# Patient Record
Sex: FEMALE | Race: WHITE | URBAN - METROPOLITAN AREA
[De-identification: names, ages, dates, MRNs, and addresses within clinical notes are randomized per-mention and may not be internally consistent; named-entity substitution may affect disease eponyms.]

---

## 2019-04-26 ENCOUNTER — TELEPHONE (OUTPATIENT)
Dept: CARE COORDINATION | Facility: CLINIC | Age: 59
End: 2019-04-26

## 2019-04-26 ENCOUNTER — TELEPHONE (OUTPATIENT)
Dept: PSYCHIATRY | Facility: CLINIC | Age: 59
End: 2019-04-26

## 2019-04-26 ENCOUNTER — PATIENT OUTREACH (OUTPATIENT)
Dept: CARE COORDINATION | Facility: CLINIC | Age: 59
End: 2019-04-26

## 2019-04-26 NOTE — TELEPHONE ENCOUNTER
"PSYCHIATRY CLINIC PHONE INTAKE     SERVICES REQUESTED / INTERESTED IN          Psychological Testing (could include ADHD testing)    Presenting Problem and Brief History                              What would you like to be seen for? (brief description):  Patients daughter called the clinic today from [place] stating that her mother [facility], as her PCP is highly recommending it. Daughter stated that she received all of this information , and it is a little difficult to get the correct information from Patient. Daughter stated patient has tried TMS in the past to no positive degree. Patient underwent TMS in the past year this last fall and winter. Daughter stated that her Provider for patient is unknown, but she believes it is in [facility]. This writer saw no past history with [facility] at this current time. Daughter stated that patients OCD is currently heightened \"Bad\". Daughter stated that her depression, and anxiety is not doing well to any degree. Daughter stated that in the past several years there was an ebb and flow to her mental health, but in the last year there has been continued heightened degrees to her mental health to a point where it is highly noticeable. Daughter stated patient states that \"She said she wants to die at this time\".  Daughter stated that patient only knows of ECT in the past when it was still in early trials and would harm people. Daughter stated that patient is on the fence of being administered ECT at this time, but her provider feels as she would be a preferred candidate within the program.       Have you received a mental health diagnosis? Yes   Which one (s): OCD, Depression, Anxiety.  Is there any history of developmental delay?  No   Are you currently seeing a mental health provider?  Yes            Who / month last seen:  Unknown.  Do you have mental health records elsewhere?  Yes  Will you sign a " release so we can obtain them?  Yes    Have you ever been hospitalized for psychiatric reasons?  Yes  Describe:  No     Do you have current thoughts of self-harm?  Yes    Do you currently have thoughts of harming others?  No       Substance Use History     Do you have any history of alcohol / illicit drug use?  No  Describe:  N/A  Have you ever received treatment for this?  No    Describe:  N/A     Social History     Does the patient have a guardian?  No    Name / number: N/A  Have you had an ACT team in last 12 months?  No  Describe: N/A   Do you have any current or past legal issues?  No  Describe: N/A   OK to leave a detailed voicemail?  Yes    Medical/ Surgical History                                 There is no problem list on file for this patient.         Medications             No current outpatient medications on file.     Klonopin 1 MG twice per day, Prozac 20 MG once per day, Remeron 30 MG at bed time, Zyprexa 15 MG once a day at bed time    DISPOSITION      This writer is calling mother to speak further of phone screen. Daughter is calling back with information of provider who referred patient to [facility].

## 2019-04-26 NOTE — TELEPHONE ENCOUNTER
"Situation: Phone call from patient's daughter.    Background: None. The only information in this patient's chart is demographics and a locked Psychiatry note from today.    Assessment: RN Care Coordinator received a phone call from this patient's daughter, Gris. She stated she lives in [place] and needed to find a  for her mother. She added that her father is not very helpful in coordinating her mother's exams, appointments, etc.  Writer explained to her that there are no records for her mother in our system (no PCP, medical history, allergies, insurance coverage, etc.), except for an enrollment in 12/2000. Writer did not disclose that there was an Intake phone screen from Psychiatry as of today's date.   Patient's daughter was surprised by the information and said she \"brought her to the clinic in [place]\" several times. But when asked about the address of the clinic, she was not sure. Writer explained that possibly it is another clinic in [place], but not a [facility] one, otherwise there would be records on file.  She was thankful and stated she was going to obtain more information from her mother.    Plan: Given the fact there is a note from Psychiatry, to which this writer does not have access to, this encounter will be routed to the author of that note.  Patient's daughter was provided with this RN CC's contact information.    Anna Marie Myers RN Care Coordinator  [facility]  Phone:  [phone] (Mondays, Wednesdays & Fridays)  Phone:  [phone] (Tuesdays & Thursdays)  Email: [email or web address]      "